# Patient Record
Sex: MALE | Race: WHITE | Employment: OTHER
[De-identification: names, ages, dates, MRNs, and addresses within clinical notes are randomized per-mention and may not be internally consistent; named-entity substitution may affect disease eponyms.]

---

## 2020-05-21 ENCOUNTER — TRANSCRIBE ORDERS (OUTPATIENT)
Dept: OTHER | Age: 75
End: 2020-05-21

## 2020-05-21 DIAGNOSIS — H53.2 DIPLOPIA: Primary | ICD-10-CM

## 2020-05-22 ENCOUNTER — TELEPHONE (OUTPATIENT)
Dept: OPHTHALMOLOGY | Facility: CLINIC | Age: 75
End: 2020-05-22

## 2020-05-22 NOTE — TELEPHONE ENCOUNTER
Scheduled June 10th at 2 PM with Dr. Salgado at [facility] location    Pt aware of date/time/location and has main clinic number    Note to financial counselor to assist in [facility] authorization    Note to facilitator for review  Cm Gardner RN 12:42 PM 05/22/20         [facility] Call Center    Phone Message    May a detailed message be left on voicemail: yes     Reason for Call: Appointment Intake    Referring Provider Name: [facility]ulam Marcel [facility] 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

## 2020-06-08 ENCOUNTER — TELEPHONE (OUTPATIENT)
Dept: OPHTHALMOLOGY | Facility: CLINIC | Age: 75
End: 2020-06-08

## 2020-06-08 NOTE — TELEPHONE ENCOUNTER
[facility] Call Center    Phone Message    May a detailed message be left on voicemail: yes     Reason for Call: Other: Pt calling back to reschedule Appt After 6/29/2019 Please call back to discuss Per Covid-19 GL's  Thank you,    Action Taken: Message routed to:  [facility] eye    Travel Screening: Not Applicable

## 2020-06-22 ENCOUNTER — TELEPHONE (OUTPATIENT)
Dept: OPHTHALMOLOGY | Facility: CLINIC | Age: 75
End: 2020-06-22

## 2020-06-22 NOTE — TELEPHONE ENCOUNTER
[facility] Call Center    Phone Message    May a detailed message be left on voicemail: yes     Reason for Call: Other: Please call patient to reschedule his apt that he missed on 6/17/2020     Action Taken: Other: Eye Clinic    Travel Screening: Not Applicable

## 2020-07-21 ENCOUNTER — TELEPHONE (OUTPATIENT)
Dept: OPHTHALMOLOGY | Facility: CLINIC | Age: 75
End: 2020-07-21

## 2020-07-21 NOTE — TELEPHONE ENCOUNTER
Spoke to pt at 1415  Reviewed very likely to have eyes dilated on exam-- fist time seeing Dr. Salgado    Reviewed patient's may drive after dilation, dependant on their comfort level-- distance vision stable, upclose more blurred and will be more light sensitive    Pt verbally demonstrated understanding    Cm Gardner RN 2:19 PM 07/21/20

## 2020-07-22 ENCOUNTER — OFFICE VISIT (OUTPATIENT)
Dept: OPHTHALMOLOGY | Facility: CLINIC | Age: 75
End: 2020-07-22
Attending: OPHTHALMOLOGY
Payer: COMMERCIAL

## 2020-07-22 DIAGNOSIS — H53.2 DOUBLE VISION: ICD-10-CM

## 2020-07-22 DIAGNOSIS — H53.10 SUBJECTIVE VISUAL DISTURBANCE: ICD-10-CM

## 2020-07-22 DIAGNOSIS — H50.05 ESOTROPIA, ALTERNATING: Primary | ICD-10-CM

## 2020-07-22 PROCEDURE — G0463 HOSPITAL OUTPT CLINIC VISIT: HCPCS | Mod: ZF | Performed by: TECHNICIAN/TECHNOLOGIST

## 2020-07-22 PROCEDURE — 92060 SENSORIMOTOR EXAMINATION: CPT | Mod: ZF | Performed by: OPHTHALMOLOGY

## 2020-07-22 ASSESSMENT — VISUAL ACUITY
CORRECTION_TYPE: GLASSES
OS_CC: 20/20
OD_CC: 20/20
OD_CC+: -3
METHOD: SNELLEN - LINEAR

## 2020-07-22 ASSESSMENT — CUP TO DISC RATIO
OD_RATIO: 0.2
OS_RATIO: 0.0

## 2020-07-22 ASSESSMENT — REFRACTION_WEARINGRX
OD_CYLINDER: +1.50
OS_CYLINDER: +1.25
OS_SPHERE: -0.75
SPECS_TYPE: SVL
OD_AXIS: 025
OS_AXIS: 173
OD_SPHERE: -1.75
OD_HPRISM: 3 BI

## 2020-07-22 ASSESSMENT — CONF VISUAL FIELD
OS_NORMAL: 1
OD_NORMAL: 1
METHOD: COUNTING FINGERS

## 2020-07-22 ASSESSMENT — TONOMETRY
IOP_METHOD: ICARE
OS_IOP_MMHG: 16
OD_IOP_MMHG: 14

## 2020-07-22 ASSESSMENT — REFRACTION_FINALRX
OS_HBASE: BASE OUT
OD_HBASE: BASE OUT
OD_HPRISM: 1.5
OS_HPRISM: 1.5

## 2020-07-22 ASSESSMENT — EXTERNAL EXAM - RIGHT EYE: OD_EXAM: NORMAL

## 2020-07-22 ASSESSMENT — EXTERNAL EXAM - LEFT EYE: OS_EXAM: NORMAL

## 2020-07-22 ASSESSMENT — SLIT LAMP EXAM - LIDS
COMMENTS: NORMAL
COMMENTS: NORMAL

## 2020-07-22 NOTE — LETTER
2020    RE: Doug Lambert  : 1945  MRN: [id number]    Dear [facility] providers,    Thank you for referring your patient, Doug Lambert, to my neuro-ophthalmology clinic recently.  After a thorough neuro-ophthalmic history and examination, I came to the following conclusions:     1. 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  The patient's glasses that he is currently wearing actually have base in prism which would worsen his underlying esodeviation at distance.  Today in the clinic he was able to fuse in primary gaze without any prisms however I am concerned that when he is tired and fixating a target very far away he probably would breakdown without prism correction.  This reason I gave him a prescription for new prism glasses with a total of 3 prism diopters base out prism divided equally between both lenses.  [facility]  The patient is also aware that his bilateral epiretinal membranes may further hinder normal fusion.  While it is reassuring that he retains good visual acuity in both eyes at this time despite the epiretinal membranes, the effects of such membranes on fusional capacity are distinct from the visual acuity.  In other words patients with intractable diplopia secondary to dental membrane may still have 20/20 or 20/25 visual acuity.  Fortunately based upon limited testing in a clinic setting it does appear that the patient is able to fuse consistently action of the esotropia.  For this reason I would not recommend epiretinal membrane surgery on the basis of double vision but certainly if his visual acuity declines then he may be a candidate.  I would leave that decision to his [facility] provider [facility] the [facility], Dr. Belcher.  Unfortunately the limited medical literature that we do have on epiretinal membrane surgery to alleviate binocular diplopia suggest that it is only effective in about one third of  patients.    Follow-up as needed with neuro-ophthalmology at [facility].    2. Bilateral epiretinal membrane- [facility].  See above Patient to [facility] at [facility].  Best corrected visual acuity still good but the epiretinal membrane may affect his fusional capacity in the future.     Doug Lambert is a pleasant 74 year old White male who presents to my neuro-ophthalmology clinic today     Patient states constant horizontal double vision, worse when driving, especially when looking to the left. Not as much in primary or right gaze. Onset 1-2 years.  Has had different glasses with prisms but none seems to help with double vision other than clarity. Has worn prism in glasses for a couple years. Current prism glasses <6 months old.  No recent neuroimaging.    Patient has had binocular diplopia with images [id number] and typically at distance.  This resolves with closure of either. He has had prism glasses for 1 year but none have helped.     Patient denies metamorphopsia in either eye    Past medical history: neurosurgery for possible arteriovenous malformation- 2 years ago.    Visual acuity today is 20/20 in both eyes.  Pupils were normal on my check without an afferent pupillary defect.  Exam (without base in prism glasses) revealed a 1 to 3 prism diopter esodeviation in all gaze positions at distance-slightly greater in left gaze as compared to other gaze positions but overall relatively comitant.  The patient benefited from 3 prism diopters base out total prism which allowed him single binocular vision in all positions.    There were broad epiretinal membranes in both maculae. There was a pseudohole in the left macula.    Again, thank you for trusting me with the care of your patient.  For further exam details, please feel free to contact our office for additional records.  If you wish to contact me regarding this patient please email me at [email or web address] or give my clinic a call to arrange  a phone conversation.    Sincerely,    Artie Nolasco MD  , Neuro-Ophthalmology and Adult Strabismus Surgery  The Jorge FLORES and Denisse Palma Chair in Neuro-Ophthalmology  Department of Ophthalmology and Visual Neurosciences  [facility]    DX: age related distance esotropia, epiretinal membranes

## 2020-07-22 NOTE — PROGRESS NOTES
1. 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  The patient's glasses that he is currently wearing actually have base in prism which would worsen his underlying esodeviation at distance.  Today in the clinic he was able to fuse in primary gaze without any prisms however I am concerned that when he is tired and fixating a target very far away he probably would breakdown without prism correction.  This reason I gave him a prescription for new prism glasses with a total of 3 prism diopters base out prism divided equally between both lenses.  [facility]  The patient is also aware that his bilateral epiretinal membranes may further hinder normal fusion.  While it is reassuring that he retains good visual acuity in both eyes at this time despite the epiretinal membranes, the effects of such membranes on fusional capacity are distinct from the visual acuity.  In other words patients with intractable diplopia secondary to dental membrane may still have 20/20 or 20/25 visual acuity.  Fortunately based upon limited testing in a clinic setting it does appear that the patient is able to fuse consistently action of the esotropia.  For this reason I would not recommend epiretinal membrane surgery on the basis of double vision but certainly if his visual acuity declines then he may be a candidate.  I would leave that decision to his [facility], Dr. Belcher.  Unfortunately the limited medical literature that we do have on epiretinal membrane surgery to alleviate binocular diplopia suggest that it is only effective in about one third of patients.    Follow-up as needed with neuro-ophthalmology at [facility].    2. Bilateral epiretinal membrane- [facility].  See above [facility].  Best corrected visual acuity still good but the epiretinal membrane may affect his fusional capacity in the future.     Doug Kevin  Petra is a pleasant 74 year old White male who presents to my neuro-ophthalmology clinic today     Patient states constant horizontal double vision, worse when driving, especially when looking to the left. Not as much in primary or right gaze. Onset 1-2 years.  Has had different glasses with prisms but none seems to help with double vision other than clarity. Has worn prism in glasses for a couple years. Current prism glasses <6 months old.    No recent neuroimaging.    Patient has had binocular diplopia with images [id number] and typically at distance.  This resolves with closure of either. He has had prism glasses for 1 year but none have helped.     Patient denies metamorphopsia in either eye    Past medical history: neurosurgery for possible arteriovenous malformation- 2 years ago.    Visual acuity today is 20/20 in both eyes.  Pupils were normal on my check without an afferent pupillary defect.  Exam (without base in prism glasses) revealed a 1 to 3 prism diopter esodeviation in all gaze positions at distance-slightly greater in left gaze as compared to other gaze positions but overall relatively comitant.  The patient benefited from 3 prism diopters base out total prism which allowed him single binocular vision in all positions.    There were broad epiretinal membranes in both maculae. There was a pseudohole in the left macula.       Complete documentation of historical and exam elements from today's encounter can be found in the full encounter summary report (not reduplicated in this progress note).  I personally obtained the chief complaint(s) and history of present illness.  I confirmed and edited as necessary the review of systems, past medical/surgical history, family history, social history, and examination findings as documented by others; and I examined the patient myself.  I personally reviewed the relevant tests, images, and reports as documented above.  I formulated and edited as necessary  the assessment and plan and discussed the findings and management plan with the patient and family     Artie Nolasco MD

## 2020-07-22 NOTE — NURSING NOTE
Chief Complaint(s) and History of Present Illness(es)     New Patient     In both eyes (Neuro-ophthalmology evaluation for double vision).  Onset: 1-2 years.  Since onset it is stable.  Associated symptoms include double vision.  Treatments tried include glasses (prism).  Response to treatment was no improvement.              Comments     Patient states constant horizontal double vision, worse when driving, especially when looking to the left. Not as much in primary or right gaze. Onset 1-2 years.  Has had different glasses with prisms but none seems to help with double vision other than clarity. Has worn prism in glasses for a couple years. Current prism glasses <6 months old.    No recent neuroimaging.    MILA Bernard 7/22/2020 9:55 AM

## 2020-11-14 ENCOUNTER — HEALTH MAINTENANCE LETTER (OUTPATIENT)
Age: 75
End: 2020-11-14

## 2020-12-11 ENCOUNTER — MYC MEDICAL ADVICE (OUTPATIENT)
Dept: OPHTHALMOLOGY | Facility: CLINIC | Age: 75
End: 2020-12-11

## 2020-12-22 NOTE — TELEPHONE ENCOUNTER
Spoke to patient at 1513. Patient reports if he lift up his glasses he can see clearer. Glasses are single vision distance with prisms only, no bifocals.    Patient requesting for an appointment to make sure his glasses prescription is made correctly. Patient understands that he has ERM that may be contributing to the double vision but just wants to double check his glasses Rx because he still has warranty per [facility] per patient. If after new prescription patient is still having trouble seeing with glasses or double vision, patient understands that he would need to follow up with his retina ophthalmologist.       Assist with making an appointment for patient to be seen tomorrow with Orthoptist, 12/23 at 10 am.     MILA Bernard 12/22/2020 3:16 PM     ----------------------    Called patient at 1322 to relay Attending message about problem with prism glasses. Outgoing call kept ringing then hung up with no choice to leave voicemail. Will attempt later today. If unsuccessful, will contact patient through [employer].    MILA Bernard 12/22/2020 1:24 PM

## 2020-12-23 ENCOUNTER — ALLIED HEALTH/NURSE VISIT (OUTPATIENT)
Dept: OPHTHALMOLOGY | Facility: CLINIC | Age: 75
End: 2020-12-23
Attending: OPHTHALMOLOGY
Payer: COMMERCIAL

## 2020-12-23 DIAGNOSIS — H53.2 DOUBLE VISION: Primary | ICD-10-CM

## 2020-12-23 PROCEDURE — 999N000103 HC STATISTIC NO CHARGE FACILITY FEE: Performed by: TECHNICIAN/TECHNOLOGIST

## 2020-12-23 ASSESSMENT — REFRACTION_WEARINGRX
OS_HPRISM: 2 BO
OS_AXIS: 173
OD_AXIS: 025
OD_SPHERE: -1.75
OD_HPRISM: 2 BO
OS_CYLINDER: +1.25
OS_SPHERE: -0.75
OD_CYLINDER: +1.75

## 2020-12-23 ASSESSMENT — VISUAL ACUITY
OD_CC+: -1
OS_CC+: -2
OD_CC: 20/20
METHOD: SNELLEN - LINEAR
OS_CC: 20/20
CORRECTION_TYPE: GLASSES

## 2020-12-23 ASSESSMENT — REFRACTION_MANIFEST
OD_AXIS: 020
OD_ADD: +2.25
OS_ADD: +2.25
OS_AXIS: 165
OS_CYLINDER: +0.50
OD_SPHERE: -1.25
OD_CYLINDER: +1.75
OS_SPHERE: -0.50

## 2020-12-23 ASSESSMENT — REFRACTION_FINALRX
OS_HPRISM: 1.5 BO
OD_HPRISM: 1.5 BO

## 2020-12-23 NOTE — NURSING NOTE
Chief Complaint(s) and History of Present Illness(es)     Follow Up     In both eyes (Prism glasses recheck).  Since onset it is stable.  Associated symptoms include double vision.              Comments     Patient states current prism glasses seems to help and improve double vision than previous ones but clarity is not in sync. States he has to lift up his glasses to see clearer.   Glasses have scratch marks along with ?coating (moon shape on bottom half of glasses) that writer was unable to clean off.     +ERM.     MILA Bernard 12/23/2020 11:20 AM

## 2020-12-23 NOTE — PROGRESS NOTES
Assessment & Plan       Doug Lambert is a 75 year old male with the following diagnoses:   1. Double vision       Chief Complaint(s) and History of Present Illness(es)     Follow Up     In both eyes (Prism glasses recheck).  Since onset it is stable.  Associated symptoms include double vision.              Comments     Patient states current prism glasses seems to help and improve double vision than previous ones but clarity is not in sync. States he has to lift up his glasses to see clearer.   Glasses have scratch marks along with ?coating (moon shape on bottom half of glasses) that writer was unable to clean off.     +ERM.     MILA Bernard 12/23/2020 11:20 AM                Today's MRx in TF, patient measures exophoria at d/n but prefers current 3 GATO in prism glasses. Tried 3 BI and patient reports targets were moving/wavy.   Had patient walked around in clinic and look far distance (out the window) and prefers GATO>BI.   Keep same prism amount, 3 GATO.    Dispensed new prism glasses. [facility].    Patient disposition:   If clarity/double vision persists with updated prism glasses prescription, patient is aware that he may need to follow up with [facility] ophthal[facility] due to ERM.      MILA Bernard 12/23/2020 11:18 AM

## 2021-02-15 ENCOUNTER — TELEPHONE (OUTPATIENT)
Dept: OPHTHALMOLOGY | Facility: CLINIC | Age: 76
End: 2021-02-15

## 2021-02-15 NOTE — TELEPHONE ENCOUNTER
Left message for patient to call me back to schedule his f/u appointment.     JACOBO MERCADO, OMAR on 2/15/2021 at 11:02 AM

## 2021-02-15 NOTE — TELEPHONE ENCOUNTER
----- Message from Artie Nolasco MD sent at 2/12/2021  2:45 PM CST -----  Regarding: Please call pt to schedule a f/u visit  Alicia,  Please call patient to schedule a follow-up visit with me in the next month or so.    Thank you. - Artie

## 2021-02-16 ENCOUNTER — TELEPHONE (OUTPATIENT)
Dept: OPHTHALMOLOGY | Facility: CLINIC | Age: 76
End: 2021-02-16

## 2021-02-16 NOTE — TELEPHONE ENCOUNTER
Patient called and left a voicemail. Returned phone call back and was able to schedule patient for 3/26/21 at [facility].     JACOBO MERCADO COT on 2/16/2021 at 8:43 AM

## 2021-03-26 ENCOUNTER — OFFICE VISIT (OUTPATIENT)
Dept: OPHTHALMOLOGY | Facility: CLINIC | Age: 76
End: 2021-03-26
Attending: OPHTHALMOLOGY
Payer: COMMERCIAL

## 2021-03-26 DIAGNOSIS — H50.05 ALTERNATING ESOTROPIA: Primary | ICD-10-CM

## 2021-03-26 DIAGNOSIS — H53.10 SUBJECTIVE VISUAL DISTURBANCE: ICD-10-CM

## 2021-03-26 DIAGNOSIS — H35.373 EPIRETINAL MEMBRANE (ERM) OF BOTH EYES: ICD-10-CM

## 2021-03-26 DIAGNOSIS — H53.2 DOUBLE VISION: ICD-10-CM

## 2021-03-26 PROCEDURE — 92134 CPTRZ OPH DX IMG PST SGM RTA: CPT | Performed by: OPHTHALMOLOGY

## 2021-03-26 PROCEDURE — 92060 SENSORIMOTOR EXAMINATION: CPT | Performed by: OPHTHALMOLOGY

## 2021-03-26 PROCEDURE — 92014 COMPRE OPH EXAM EST PT 1/>: CPT | Mod: GC | Performed by: OPHTHALMOLOGY

## 2021-03-26 PROCEDURE — G0463 HOSPITAL OUTPT CLINIC VISIT: HCPCS

## 2021-03-26 ASSESSMENT — TONOMETRY
OD_IOP_MMHG: 13
IOP_METHOD: ICARE
OS_IOP_MMHG: 12

## 2021-03-26 ASSESSMENT — SLIT LAMP EXAM - LIDS
COMMENTS: NORMAL
COMMENTS: NORMAL

## 2021-03-26 ASSESSMENT — REFRACTION_WEARINGRX
OD_CYLINDER: +1.75
OD_AXIS: 020
OS_CYLINDER: +0.50
SPECS_TYPE: SVL
OD_SPHERE: -1.25
OS_AXIS: 165
OD_HPRISM: 1.5 BO
OS_HPRISM: 1.5 BO
OS_SPHERE: -0.50

## 2021-03-26 ASSESSMENT — VISUAL ACUITY
METHOD: SNELLEN - LINEAR
OS_CC+: -2
CORRECTION_TYPE: GLASSES
OD_CC: 20/20
OD_CC+: -2
OS_CC: 20/20

## 2021-03-26 ASSESSMENT — REFRACTION_FINALRX
OD_HPRISM: 2.5 BO
OS_HPRISM: 2.5 BO

## 2021-03-26 ASSESSMENT — CONF VISUAL FIELD
OD_NORMAL: 1
OS_NORMAL: 1
METHOD: COUNTING FINGERS

## 2021-03-26 ASSESSMENT — EXTERNAL EXAM - LEFT EYE: OS_EXAM: NORMAL

## 2021-03-26 ASSESSMENT — CUP TO DISC RATIO
OS_RATIO: 0.0
OD_RATIO: 0.2

## 2021-03-26 ASSESSMENT — EXTERNAL EXAM - RIGHT EYE: OD_EXAM: NORMAL

## 2021-03-26 NOTE — PROGRESS NOTES
"   1. 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  Our measurements today did indicate that he may benefit from additional base out prism however I informed him that many times in similar situations this does not result in a resolution of diplopia due to the central/peripheral rivalry problems inherent with epiretinal membrane associated diplopia.  If these glasses do not resolve his issue then I am afraid glasses are unlikely to result in an improvement.    We did discuss the possibility of epiretinal membrane peeling surgery.  His surgery is often quite successful to improve visual acuity however it success for patients with binocular diplopia is quite poor with our best estimates from small studies in case series being about 30%.  The issue is that when the position of foveal photo receptors have been altered by an epiretinal membrane many times they will not go back to their physiologic position after strabismus surgery.    The patient will continue to follow-up with Dr. Belcher at the [facility].  Again I told him that if the current glasses prescription that I gave him with a total of 5 base out ground in prism divided equally between the lenses does not resolve his subjective complaints then no pair of glasses will in my opinion.  Continuing to seek out new glasses is only likely to lead to frustration.  He could consider visual deprivation by placing a wrong strength contact in 1 eye such as in the left eye which would essentially hide his diplopia and lead him to function predominantly monocular.  He will consider this option in the future.    I did not make a follow-up appointment, but I would be happy to see the patient back in the future " should any new neuro-ophthalmic concern arise.    [facility]    Historical data from initial visit with me:    Doug Lambert is a pleasant male who presents to my neuro-ophthalmology clinic today      Patient states constant horizontal double vision, worse when driving, especially when looking to the left. Not as much in primary or right gaze. Onset 1-2 years.  Has had different glasses with prisms but none seems to help with double vision other than clarity. Has worn prism in glasses for a couple years. Current prism glasses <6 months old.    No recent neuroimaging.     Patient has had binocular diplopia with images [id number] and typically at distance.  This resolves with closure of either. He has had prism glasses for 1 year but none have helped.      Past medical history: neurosurgery for possible arteriovenous malformation- 2 years ago.    Interm history    He reports that he still has binocular diplopia when he his driving with prism glasses on especially on lateral gazes. He tends to close one eye to see single. 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    The remainder of the exam was unremarkable.  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       Complete documentation of historical and exam elements from today's encounter can be found in the full encounter summary report (not reduplicated in this progress note).  I personally obtained the chief complaint(s) and history of present illness.  I confirmed and edited as necessary the  review of systems, past medical/surgical history, family history, social history, and examination findings as documented by others; and I examined the patient myself.  I personally reviewed the relevant tests, images, and reports as documented above.  I formulated and edited as necessary the assessment and plan and discussed the findings and management plan with the patient and family.  I personally reviewed the ophthalmic test(s) associated with this encounter, agree with the interpretation(s) as documented by the resident/fellow, and have edited the corresponding report(s) as necessary.     MD Obdulia Lou MD  Neuro-ophthalmology fellow  [facility]

## 2021-03-26 NOTE — LETTER
"2021    RE: Doug Lambert  : 1945  MRN: [id number]    Dear Providers,    I saw our mutual patient, Doug Lambert, in follow-up in my clinic recently.  After a thorough neuro-ophthalmic history and examination, I came to the following conclusions:     1. 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  Our measurements today did indicate that he may benefit from additional base out prism however I informed him that many times in similar situations this does not result in a resolution of diplopia due to the central/peripheral rivalry problems inherent with epiretinal membrane associated diplopia.  If these glasses do not resolve his issue then I am afraid glasses are unlikely to result in an improvement.    We did discuss the possibility of epiretinal membrane peeling surgery.  His surgery is often quite successful to improve visual acuity however it success for patients with binocular diplopia is quite poor with our best estimates from small studies in case series being about 30%.  The issue is that when the position of foveal photo receptors have been altered by an epiretinal membrane many times they will not go back to their physiologic position after strabismus surgery.    The patient will continue to follow-up with Dr. Belcher at the [facility].  Again I told him that if the current glasses prescription that I gave him with a total of 5 base out ground in prism divided equally between the lenses does not resolve his subjective complaints then no pair of glasses will in my opinion.  Continuing to seek out new glasses is only likely to lead to frustration.  He could consider visual deprivation by placing a wrong " strength contact in 1 eye such as in the left eye which would essentially hide his diplopia and lead him to function predominantly monocular.  He will consider this option in the future.    I did not make a follow-up appointment, but I would be happy to see the patient back in the future should any new neuro-ophthalmic concern arise.    [facility]    Historical data from initial visit with me:    Doug Lambert is a pleasant male who presents to my neuro-ophthalmology clinic today      Patient states constant horizontal double vision, worse when driving, especially when looking to the left. Not as much in primary or right gaze. Onset 1-2 years.  Has had different glasses with prisms but none seems to help with double vision other than clarity. Has worn prism in glasses for a couple years. Current prism glasses <6 months old.    No recent neuroimaging.     Patient has had binocular diplopia with images [id number] and typically at distance.  This resolves with closure of either. He has had prism glasses for 1 year but none have helped.      Past medical history: neurosurgery for possible arteriovenous malformation- 2 years ago.    Interm history    He reports that he still has binocular diplopia when he his driving with prism glasses on especially on lateral gazes. He tends to close one eye to see single. 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    The remainder of the exam was unremarkable.  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        For further exam details, please feel free to contact our office for additional records.  If you wish to contact me regarding this patient please email me at [email or web address] or give my clinic a call to arrange a phone conversation.    Sincerely,    Artie Nolasco MD  , Neuro-Ophthalmology and Adult Strabismus Surgery  The Jorge Palma Chair in Neuro-Ophthalmology  Department of Ophthalmology and Visual Neurosciences  [facility]

## 2021-09-12 ENCOUNTER — HEALTH MAINTENANCE LETTER (OUTPATIENT)
Age: 76
End: 2021-09-12

## 2022-01-02 ENCOUNTER — HEALTH MAINTENANCE LETTER (OUTPATIENT)
Age: 77
End: 2022-01-02

## 2022-10-30 ENCOUNTER — HEALTH MAINTENANCE LETTER (OUTPATIENT)
Age: 77
End: 2022-10-30

## 2023-04-08 ENCOUNTER — HEALTH MAINTENANCE LETTER (OUTPATIENT)
Age: 78
End: 2023-04-08